# Patient Record
Sex: FEMALE | Race: WHITE | NOT HISPANIC OR LATINO | Employment: FULL TIME | URBAN - METROPOLITAN AREA
[De-identification: names, ages, dates, MRNs, and addresses within clinical notes are randomized per-mention and may not be internally consistent; named-entity substitution may affect disease eponyms.]

---
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
## 2022-01-06 NOTE — PROGRESS NOTES
HEMATOLOGY ONCOLOGY OUTPATIENT FOLLOW UP    Patient name: Tri Zapata  : 2000  MRN: [id number]  Primary Care Physician: Leyla Ha APRN  Referring Physician: Leyla Ha APRN  Reason For Consult:     Chief Complaint   Patient presents with   • Follow-up     History of acute lymphocytic leukemia         HPI:   History of Present Illness:  Tri Zapata is 21 y.o. female who presented to our office on 21 for consultation regarding history of acute lymphocytic leukemia.    Patient has remote history of a LL diagnosed at age 5 she received induction chemo followed by maintenance.  She has been disease-free ever since.  She did not require any radiation treatment.  Is unclear what chemo she was on.  Her treatment was completed in 2008.  When patient was seen for consultation, patient has had a weight loss more recently of about 20 pounds.  She states that most of this is unintentional however she has been working out and doing strength training during this time.  She is also had an episode of irregular bowel movements constipation, she had black vaginal discharge, cramping and pelvic pain and was put on antibiotic in 2020.  Since then this is resolved.  She has had regular menstrual periods since then.  She did complain of ongoing fatigue which has been worse lately.  She denies any significant fevers, she does have some night sweats not drenching.    At that point we had a CBC which was unremarkable, TSH free T4 unremarkable.    Subjective:    Patient is now seen for follow-up her symptoms of fatigue improved, weight loss is improved, no other new symptoms of concern no constitutional symptoms currently.    The following portions of the patient's history were reviewed and updated as appropriate: allergies, current medications, past family history, past medical history, past social history, past surgical history and problem list.    Past Medical History:   Diagnosis Date   • Acute  "lymphoid leukemia (ALL), L1 (HCC)    • Fatigue    • Nausea        History reviewed. No pertinent surgical history.    No current outpatient medications on file.    No Known Allergies    Family History   Problem Relation Age of Onset   • Hyperlipidemia Father    • Hypertension Father    • Other Maternal Grandmother         Malignant neoplastic disease   • Other Maternal Grandfather         Malignant neoplastic disease   • Other Paternal Grandmother         Malignant neoplastic disease   prostate, brain,   Cousins with breast cancer, brain cancer at young age.    Cancer-related family history is not on file.    Social History     Tobacco Use   • Smoking status: Never Smoker   • Smokeless tobacco: Never Used   Substance Use Topics   • Alcohol use: Never   • Drug use: Never     Social History     Social History Narrative   • Not on file        ROS:     Review of Systems   Constitutional: Negative for fatigue and fever.   HENT: Negative for congestion and nosebleeds.    Eyes: Negative for pain and discharge.   Respiratory: Negative for cough and shortness of breath.    Cardiovascular: Negative for chest pain.   Gastrointestinal: Negative for abdominal pain, blood in stool, diarrhea, nausea and vomiting.   Endocrine: Negative for cold intolerance and heat intolerance.   Genitourinary: Negative for difficulty urinating.   Musculoskeletal: Negative for arthralgias.   Skin: Negative for rash.   Neurological: Negative for dizziness and headaches.   Hematological: Does not bruise/bleed easily.   Psychiatric/Behavioral: Negative for behavioral problems.       Objective:    Vitals:    01/07/22 1014   BP: 105/71   Pulse: 61   Resp: 18   Temp: 97.1 °F (36.2 °C)   SpO2: 99%   Weight: 54 kg (119 lb)   Height: 162.6 cm (64\")   PainSc: 0-No pain     Body mass index is 20.43 kg/m².  ECOG  (0) Fully active, able to carry on all predisease performance without restriction    Physical Exam:     Physical Exam  Constitutional:       " Appearance: Normal appearance.   HENT:      Head: Normocephalic and atraumatic.   Eyes:      Pupils: Pupils are equal, round, and reactive to light.   Cardiovascular:      Rate and Rhythm: Normal rate and regular rhythm.      Pulses: Normal pulses.      Heart sounds: No murmur heard.      Pulmonary:      Effort: Pulmonary effort is normal.      Breath sounds: Normal breath sounds.   Abdominal:      General: There is no distension.      Palpations: Abdomen is soft. There is no mass.      Tenderness: There is no abdominal tenderness.   Musculoskeletal:         General: Normal range of motion.      Cervical back: Normal range of motion and neck supple.   Skin:     General: Skin is warm.   Neurological:      General: No focal deficit present.      Mental Status: She is alert.   Psychiatric:         Mood and Affect: Mood normal.               Lab Results - Last 18 Months   Lab Units 01/07/22  0949 07/01/21  1440   WBC 10*3/mm3 4.31 5.69   HEMOGLOBIN g/dL 12.3 13.5   HEMATOCRIT % 37.9 41.2   PLATELETS 10*3/mm3 199 221   MCV fL 90.0 88.4           Assessment/Plan     Patient is a 20-year-old female with history of acute lymphocytic leukemia now with fatigue, weight loss.    History of acute lymphocytic leukemia  I discussed with the patient that there is a very low risk of her having a relapse of ALL 13 years after treatment.  CBC LDH peripheral smear was unremarkable.    Weight loss is mostly related to her increased strength training possibly affecting it.  Now has improved  Fatigue is improved.  CBC checked again today is unremarkable.  I do not think patient needs routine hematology monitoring.  She will see her primary care physician for an annual physical.  If any concerns about her lab work in the future she can always be referred back to us.    Fatigue  Ruled out thyroid dysfunction, fatigue has not improved.  Could be related to strength training that she had at that time.    Cancer screening  Patient is getting  Pap smears routinely.  She will need routine cancer screening with her primary care physician.         Thank you very much for providing the opportunity to participate in this patient’s care. Please do not hesitate to call if there are any other questions    I have reviewed and confirmed the accuracy of the patient's history: Chief complaint, HPI, ROS, Subjective and Past Family Social History as entered by the MA/LPN/RN.     Amrik Hernandez MD 01/07/22

## 2022-01-07 ENCOUNTER — OFFICE VISIT (OUTPATIENT)
Dept: ONCOLOGY | Facility: CLINIC | Age: 22
End: 2022-01-07

## 2022-01-07 ENCOUNTER — TELEPHONE (OUTPATIENT)
Dept: ONCOLOGY | Facility: CLINIC | Age: 22
End: 2022-01-07

## 2022-01-07 ENCOUNTER — LAB (OUTPATIENT)
Dept: LAB | Facility: HOSPITAL | Age: 22
End: 2022-01-07

## 2022-01-07 VITALS
BODY MASS INDEX: 20.32 KG/M2 | WEIGHT: 119 LBS | HEART RATE: 61 BPM | RESPIRATION RATE: 18 BRPM | DIASTOLIC BLOOD PRESSURE: 71 MMHG | SYSTOLIC BLOOD PRESSURE: 105 MMHG | TEMPERATURE: 97.1 F | OXYGEN SATURATION: 99 % | HEIGHT: 64 IN

## 2022-01-07 DIAGNOSIS — Z85.6 HISTORY OF ACUTE LYMPHOBLASTIC LEUKEMIA (ALL): Primary | ICD-10-CM

## 2022-01-07 LAB
BASOPHILS # BLD AUTO: 0.02 10*3/MM3 (ref 0–0.2)
BASOPHILS NFR BLD AUTO: 0.5 % (ref 0–1.5)
DEPRECATED RDW RBC AUTO: 41.9 FL (ref 37–54)
EOSINOPHIL # BLD AUTO: 0.03 10*3/MM3 (ref 0–0.4)
EOSINOPHIL NFR BLD AUTO: 0.7 % (ref 0.3–6.2)
ERYTHROCYTE [DISTWIDTH] IN BLOOD BY AUTOMATED COUNT: 12.8 % (ref 12.3–15.4)
HCT VFR BLD AUTO: 37.9 % (ref 34–46.6)
HGB BLD-MCNC: 12.3 G/DL (ref 12–15.9)
HOLD SPECIMEN: NORMAL
HOLD SPECIMEN: NORMAL
LYMPHOCYTES # BLD AUTO: 1.56 10*3/MM3 (ref 0.7–3.1)
LYMPHOCYTES NFR BLD AUTO: 36.2 % (ref 19.6–45.3)
MCH RBC QN AUTO: 29.2 PG (ref 26.6–33)
MCHC RBC AUTO-ENTMCNC: 32.5 G/DL (ref 31.5–35.7)
MCV RBC AUTO: 90 FL (ref 79–97)
MONOCYTES # BLD AUTO: 0.49 10*3/MM3 (ref 0.1–0.9)
MONOCYTES NFR BLD AUTO: 11.4 % (ref 5–12)
NEUTROPHILS NFR BLD AUTO: 2.21 10*3/MM3 (ref 1.7–7)
NEUTROPHILS NFR BLD AUTO: 51.2 % (ref 42.7–76)
PLATELET # BLD AUTO: 199 10*3/MM3 (ref 140–450)
PMV BLD AUTO: 10.3 FL (ref 6–12)
RBC # BLD AUTO: 4.21 10*6/MM3 (ref 3.77–5.28)
WBC NRBC COR # BLD: 4.31 10*3/MM3 (ref 3.4–10.8)

## 2022-01-07 PROCEDURE — 99213 OFFICE O/P EST LOW 20 MIN: CPT | Performed by: INTERNAL MEDICINE

## 2022-01-07 PROCEDURE — 36415 COLL VENOUS BLD VENIPUNCTURE: CPT

## 2022-01-07 PROCEDURE — 85025 COMPLETE CBC W/AUTO DIFF WBC: CPT

## 2022-01-07 NOTE — TELEPHONE ENCOUNTER
Contacted the pt to see if she was wanting to reschedule due to the weather or if she still planned on coming into her apt today. I called while I was working from home as well as once I got back to the office today. The pt did not answer either time. V/m was left with call back information.

## 2023-11-07 ENCOUNTER — OFFICE VISIT (OUTPATIENT)
Dept: CARDIOLOGY | Facility: CLINIC | Age: 23
End: 2023-11-07
Payer: COMMERCIAL

## 2023-11-07 VITALS
HEART RATE: 64 BPM | SYSTOLIC BLOOD PRESSURE: 98 MMHG | BODY MASS INDEX: 22.62 KG/M2 | WEIGHT: 132.5 LBS | HEIGHT: 64 IN | DIASTOLIC BLOOD PRESSURE: 62 MMHG | OXYGEN SATURATION: 95 %

## 2023-11-07 DIAGNOSIS — R00.2 PALPITATIONS: Primary | ICD-10-CM

## 2023-11-07 DIAGNOSIS — E78.5 HYPERLIPIDEMIA LDL GOAL <100: ICD-10-CM

## 2023-11-07 DIAGNOSIS — Z85.6 HISTORY OF ACUTE LYMPHOBLASTIC LEUKEMIA (ALL): ICD-10-CM

## 2023-11-07 RX ORDER — NORGESTIMATE AND ETHINYL ESTRADIOL 7DAYSX3 28
1 KIT ORAL DAILY
COMMUNITY

## 2023-11-07 NOTE — PROGRESS NOTES
Encounter Date:11/07/2023      Patient ID: Tri Zapata is a 23 y.o. female.    Chief Complaint   Patient presents with    Palpitations    Shortness of Breath          Palpitations   Associated symptoms include chest pain, dizziness, malaise/fatigue and shortness of breath. Pertinent negatives include no coughing, diaphoresis, nausea, numbness or vomiting.   Shortness of Breath  Associated symptoms include chest pain. Pertinent negatives include no abdominal pain, headaches, leg swelling or vomiting.     23-year-old with history of childhood a LL presents as a new patient consult for palpitations.she was previously seen in the past and palpitations.  She had an ER visit in 2021 with similar complaints.  At that time work-up was negative.  Holter monitor reviewed from 2021 shows average heart rate of 71 bpm with max heart rate of 148 bpm.  She had 22 beats of PACs with no evidence of SVT.    She presents today to establish care.  She said she has had palpitations for a few years.  She says they happen with activity and rest.  She has about 1-2 times a week.  It sometimes wakes her up from sleep.  She does feel like it is getting more frequent.  She sometimes gets chest tightness and shortness of air and dizzy and lightheaded with these episodes.  She is pretty active and was lifting weights but has backed off on this to avoid these episodes.  She does feel like they are more intense when she is lifting weights.  She continues to exercise but sometimes stops herself if she feels that her palpitations.  She did give up caffeine.  Drinks alcohol occasionally.  Takes no supplements.  She has family history of hyperlipidemia.  Of note her recent lipid panel showed a total cholesterol of 266 with LDL of 159 and HDL of 79.  She ate a very healthy diet and does not eat any fried foods.      EKG in clinic today shows sinus rhythm with PACs and a rate of 64 bpm.  No acute ST-T changes.  No change compared to prior.      The  "following portions of the patient's history were reviewed and updated as Appropriate: allergies, current medications, past family history, past medical history, past social history, past surgical history, and problem list.    Review of Systems   Constitutional: Positive for malaise/fatigue. Negative for diaphoresis.   HENT:  Negative for nosebleeds.    Cardiovascular:  Positive for chest pain and palpitations. Negative for dyspnea on exertion and leg swelling.   Respiratory:  Positive for shortness of breath. Negative for cough.    Gastrointestinal:  Negative for abdominal pain, nausea and vomiting.   Neurological:  Positive for dizziness. Negative for focal weakness, headaches, light-headedness and numbness.   All other systems reviewed and are negative.        Current Outpatient Medications:     norgestimate-ethinyl estradiol (Tri-Sprintec) 0.18/0.215/0.25 MG-35 MCG per tablet, Take 1 tablet by mouth Daily., Disp: , Rfl:     No Known Allergies    Family History   Problem Relation Age of Onset    Hyperlipidemia Father     Hypertension Father     Other Maternal Grandmother         Malignant neoplastic disease    Other Maternal Grandfather         Malignant neoplastic disease    Other Paternal Grandmother         Malignant neoplastic disease       History reviewed. No pertinent surgical history.    Past Medical History:   Diagnosis Date    Acute lymphoid leukemia (ALL), L1     Fatigue     Nausea        Social History     Socioeconomic History    Marital status: Single   Tobacco Use    Smoking status: Never    Smokeless tobacco: Never   Vaping Use    Vaping Use: Never used   Substance and Sexual Activity    Alcohol use: Never    Drug use: Never    Sexual activity: Defer         Procedures      Objective:       Physical Exam    BP 98/62 (BP Location: Left arm, Patient Position: Sitting)   Pulse 64   Ht 162.6 cm (64\")   Wt 60.1 kg (132 lb 8 oz)   SpO2 95%   BMI 22.74 kg/m²   The patient is alert, oriented and in no " distress.    Vital signs as noted above.    Head and neck revealed no carotid bruits or jugular venous distension.  No thyromegaly or lymphadenopathy is present.    Lungs clear.  No wheezing.  Breath sounds are normal bilaterally.    Heart normal first and second heart sounds.  No murmur..  No pericardial rub is present.  No gallop is present.    Abdomen soft and nontender.  No organomegaly is present.    Extremities revealed good peripheral pulses without any pedal edema.    Skin warm and dry.    Musculoskeletal system is grossly normal.    CNS grossly normal.           Diagnosis Plan   1. Palpitations [R00.2]        2. Hyperlipidemia LDL goal <100        3. History of acute lymphoblastic leukemia (ALL)        LAB RESULTS (LAST 7 DAYS)    CBC        BMP        CMP         BNP        TROPONIN        CoAg        Creatinine Clearance  CrCl cannot be calculated (Patient's most recent lab result is older than the maximum 30 days allowed.).    ABG        Radiology  No radiology results for the last day         Assessment and Plan       Diagnoses and all orders for this visit:    1. Palpitations [R00.2] (Primary)    2. Hyperlipidemia LDL goal <100    3. History of acute lymphoblastic leukemia (ALL)    Palpitations  We will obtain a repeat Holter to evaluate for any rhythm he is  I am hesitant to start her on any medications given her age  I suspect she has SVT  We talked about vagal maneuvers in clinic today  I encouraged her to continue exercise and stop if she has episodes  We will also obtain an echocardiogram      Hyperlipidemia  Believe she has familial hypercholesterolemia given how healthy she is and what a healthy lifestyle she leads and her family history  We discussed completely cutting out fatty foods and repeating a lipid panel  I would be more aggressive about starting her on therapy for this given its long-term implications for her cardiac health    History of AL L  Currently in remission    Jody Rodriguez  MD

## 2023-11-14 ENCOUNTER — OUTSIDE FACILITY SERVICE (OUTPATIENT)
Dept: CARDIOLOGY | Facility: CLINIC | Age: 23
End: 2023-11-14
Payer: COMMERCIAL

## 2023-11-14 PROCEDURE — 93306 TTE W/DOPPLER COMPLETE: CPT | Performed by: INTERNAL MEDICINE

## 2023-11-14 PROCEDURE — 93227 XTRNL ECG REC<48 HR R&I: CPT | Performed by: INTERNAL MEDICINE

## 2025-02-08 NOTE — PROGRESS NOTES
Encounter Date:02/11/2025        Patient ID: Tri Zapata is a 24 y.o. female.      Chief Complaint:      History of Present Illness  24-year-old with history of childhood ALL presents for follow-up.     She was previously seen for palpitations.  She had an ER visit in 2021 with similar complaints.  At that time work-up was negative.  Holter monitor reviewed from 2021 shows average heart rate of 71 bpm with max heart rate of 148 bpm.  She had 22 beats of PACs with no evidence of SVT.    She presents today to establish care.  She said she has had palpitations for a few years.  She says they happen with activity and rest.  She has about 1-2 times a week.  It sometimes wakes her up from sleep.  She does feel like it is getting more frequent.  She sometimes gets chest tightness and shortness of air and dizzy and lightheaded with these episodes.  She is pretty active and was lifting weights but has backed off on this to avoid these episodes.  She does feel like they are more intense when she is lifting weights.  She continues to exercise but sometimes stops herself if she feels that her palpitations.  She did give up caffeine.  Drinks alcohol occasionally.  Takes no supplements.  She has family history of hyperlipidemia.  Of note her recent lipid panel showed a total cholesterol of 266 with LDL of 159 and HDL of 79.  She ate a very healthy diet and does not eat any fried foods.     ECG shows normal sinus rhythm.  Heart rate 63, VT interval 144, QRS 74 and QTc 407 ms    Review of system positive for chest pain, shortness of breath, dizziness, lightheadedness.  She is currently on no cardiac medications.    The following portions of the patient's history were reviewed and updated as appropriate: allergies, current medications, past family history, past medical history, past social history, past surgical history, and problem list.    Review of Systems   Constitutional: Positive for malaise/fatigue.   Cardiovascular:   "Positive for chest pain and palpitations. Negative for dyspnea on exertion and leg swelling.   Respiratory:  Positive for shortness of breath. Negative for cough.    Gastrointestinal:  Negative for abdominal pain, nausea and vomiting.   Neurological:  Positive for dizziness and light-headedness. Negative for focal weakness, headaches and numbness.   All other systems reviewed and are negative.        Current Outpatient Medications:     norgestimate-ethinyl estradiol (Tri-Sprintec) 0.18/0.215/0.25 MG-35 MCG per tablet, Take 1 tablet by mouth Daily., Disp: , Rfl:     Current outpatient and discharge medications have been reconciled for the patient.  Reviewed by: Harrison Palm MD       No Known Allergies    Family History   Problem Relation Age of Onset    Hyperlipidemia Father     Hypertension Father     Heart attack Father         had one at 47, quadruple bypass       Past Surgical History:   Procedure Laterality Date    WISDOM TOOTH EXTRACTION         Past Medical History:   Diagnosis Date    Acute lymphoid leukemia (ALL), L1     Arrhythmia 2017    have had episodes of extremely high heart rate and chest pains    Fatigue     Hyperlipidemia oct, 2023    bringing charts with numbers to appointmenr    Nausea        Family History   Problem Relation Age of Onset    Hyperlipidemia Father     Hypertension Father     Heart attack Father         had one at 47, quadruple bypass       Social History     Socioeconomic History    Marital status: Single   Tobacco Use    Smoking status: Never    Smokeless tobacco: Never   Vaping Use    Vaping status: Never Used   Substance and Sexual Activity    Alcohol use: Never    Drug use: Never    Sexual activity: Yes     Partners: Male     Birth control/protection: Birth control pill     Comment: Trisprintec               Objective:       Physical Exam    /75 (BP Location: Left arm, Patient Position: Sitting, Cuff Size: Adult)   Pulse 77   Ht 162.6 cm (64\")   Wt 63 kg (139 lb)   " LMP  (LMP Unknown)   SpO2 100%   BMI 23.86 kg/m²   The patient is alert, oriented and in no distress.    Vital signs as noted above.    Head and neck revealed no carotid bruits or jugular venous distension.  No thyromegaly or lymphadenopathy is present.    Lungs clear.  No wheezing.  Breath sounds are normal bilaterally.    Heart normal first and second heart sounds.  No murmur..  No pericardial rub is present.  No gallop is present.    Abdomen soft and nontender.  No organomegaly is present.    Extremities revealed good peripheral pulses without any pedal edema.    Skin warm and dry.    Musculoskeletal system is grossly normal.    CNS grossly normal.           Diagnosis Plan   1. Palpitations [R00.2]        2. Hyperlipidemia LDL goal <100        3. History of acute lymphoblastic leukemia (ALL)        LAB RESULTS (LAST 7 DAYS)    CBC        BMP        CMP         BNP        TROPONIN        CoAg        Creatinine Clearance  CrCl cannot be calculated (Patient's most recent lab result is older than the maximum 30 days allowed.).    ABG        Radiology  No radiology results for the last day    EKG  Procedures    Stress test      Echocardiogram      Cardiac catheterization  No results found for this or any previous visit.          Assessment and Plan       Diagnoses and all orders for this visit:    1. Palpitations [R00.2] (Primary)    2. Hyperlipidemia LDL goal <100    3. History of acute lymphoblastic leukemia (ALL)         Palpitations  Holter monitor showed 4 episodes of SVT which were symptomatic  Echocardiogram shows normal LV function, normal diastolic function and no significant structural abnormality  Encouraged hydration, aerobic physical activities  She does not drink caffeine or energy drinks  I will start Toprol-XL 25 mg p.o. daily.  She was recommended to report back via [facility] where we can uptitrate her Toprol-XL     Hyperlipidemia  , HDL 79 and total cholesterol 266.  She most likely has  familial hypercholesterolemia  Recommended high intensity statin  Goal LDL less than 100     History of ALL  Currently in remission

## 2025-02-11 ENCOUNTER — OFFICE VISIT (OUTPATIENT)
Dept: CARDIOLOGY | Facility: CLINIC | Age: 25
End: 2025-02-11
Payer: COMMERCIAL

## 2025-02-11 VITALS
HEART RATE: 77 BPM | DIASTOLIC BLOOD PRESSURE: 75 MMHG | WEIGHT: 139 LBS | BODY MASS INDEX: 23.73 KG/M2 | HEIGHT: 64 IN | SYSTOLIC BLOOD PRESSURE: 115 MMHG | OXYGEN SATURATION: 100 %

## 2025-02-11 DIAGNOSIS — E78.5 HYPERLIPIDEMIA LDL GOAL <100: ICD-10-CM

## 2025-02-11 DIAGNOSIS — Z85.6 HISTORY OF ACUTE LYMPHOBLASTIC LEUKEMIA (ALL): ICD-10-CM

## 2025-02-11 DIAGNOSIS — R00.2 PALPITATIONS: Primary | ICD-10-CM

## 2025-02-11 RX ORDER — METOPROLOL SUCCINATE 25 MG/1
25 TABLET, EXTENDED RELEASE ORAL DAILY
Qty: 90 TABLET | Refills: 3 | Status: SHIPPED | OUTPATIENT
Start: 2025-02-11

## 2025-02-11 RX ORDER — ROSUVASTATIN CALCIUM 10 MG/1
10 TABLET, COATED ORAL DAILY
Qty: 90 TABLET | Refills: 3 | Status: SHIPPED | OUTPATIENT
Start: 2025-02-11

## 2025-08-20 ENCOUNTER — OFFICE VISIT (OUTPATIENT)
Dept: CARDIOLOGY | Facility: CLINIC | Age: 25
End: 2025-08-20
Payer: COMMERCIAL

## 2025-08-20 VITALS
OXYGEN SATURATION: 99 % | SYSTOLIC BLOOD PRESSURE: 107 MMHG | BODY MASS INDEX: 23.22 KG/M2 | HEIGHT: 64 IN | HEART RATE: 54 BPM | DIASTOLIC BLOOD PRESSURE: 74 MMHG | WEIGHT: 136 LBS

## 2025-08-20 DIAGNOSIS — E78.5 HYPERLIPIDEMIA LDL GOAL <100: ICD-10-CM

## 2025-08-20 DIAGNOSIS — Z85.6 HISTORY OF ACUTE LYMPHOBLASTIC LEUKEMIA (ALL): ICD-10-CM

## 2025-08-20 DIAGNOSIS — R00.2 PALPITATIONS: Primary | ICD-10-CM

## 2025-08-28 ENCOUNTER — RESULTS FOLLOW-UP (OUTPATIENT)
Dept: CARDIOLOGY | Facility: CLINIC | Age: 25
End: 2025-08-28
Payer: COMMERCIAL

## 2025-08-28 RX ORDER — ROSUVASTATIN CALCIUM 20 MG/1
20 TABLET, COATED ORAL DAILY
Qty: 90 TABLET | Refills: 1 | Status: SHIPPED | OUTPATIENT
Start: 2025-08-28